# Patient Record
Sex: FEMALE | Race: WHITE | Employment: OTHER | URBAN - METROPOLITAN AREA
[De-identification: names, ages, dates, MRNs, and addresses within clinical notes are randomized per-mention and may not be internally consistent; named-entity substitution may affect disease eponyms.]

---

## 2019-07-15 PROBLEM — N81.9 PELVIC PROLAPSE: Status: ACTIVE | Noted: 2017-01-24

## 2019-07-15 PROBLEM — D04.71 SQUAMOUS CELL CARCINOMA IN SITU OF SKIN OF RIGHT LOWER LEG: Status: ACTIVE | Noted: 2019-02-15

## 2019-07-15 PROBLEM — N18.30 STAGE 3 CHRONIC KIDNEY DISEASE (HCC): Status: ACTIVE | Noted: 2019-01-16

## 2019-07-15 PROBLEM — E66.9 OBESITY (BMI 30-39.9): Status: ACTIVE | Noted: 2019-01-16

## 2019-07-15 PROBLEM — F41.1 GAD (GENERALIZED ANXIETY DISORDER): Status: ACTIVE | Noted: 2019-01-16

## 2019-07-15 PROBLEM — E87.70 HYPERVOLEMIA: Status: ACTIVE | Noted: 2019-01-16

## 2019-07-15 PROBLEM — E78.5 HYPERLIPIDEMIA: Status: ACTIVE | Noted: 2019-01-16

## 2019-07-15 PROBLEM — M19.90 OSTEOARTHROSIS: Status: ACTIVE | Noted: 2019-01-16

## 2019-07-15 PROBLEM — M54.30 SCIATICA: Status: ACTIVE | Noted: 2019-01-16

## 2019-07-15 PROBLEM — I10 MALIGNANT ESSENTIAL HYPERTENSION: Status: ACTIVE | Noted: 2019-01-16

## 2019-07-15 PROBLEM — Z90.710 S/P HYSTERECTOMY: Status: ACTIVE | Noted: 2017-08-24

## 2020-01-20 PROBLEM — Z86.39 HISTORY OF HYPERCALCEMIA: Status: ACTIVE | Noted: 2020-01-20

## 2020-01-20 PROBLEM — E87.1 HYPONATREMIA: Status: ACTIVE | Noted: 2020-01-20

## 2023-01-27 ENCOUNTER — TELEPHONE (OUTPATIENT)
Dept: CARDIOLOGY CLINIC | Age: 83
End: 2023-01-27

## 2023-01-27 NOTE — TELEPHONE ENCOUNTER
Spoke w/ pt. Made appt with Dr Aj Sam 2/1/23 at [place] [address] in [place]. Patient is agreeable to this appt date/time.

## 2023-01-27 NOTE — TELEPHONE ENCOUNTER
New Patient Referral    Referring Provider Name:Marianna Oquendo   Phone Number:[phone]   Fax Number:[phone]  Address: [address] [place] [address]    Diagnosis/Reason for Visit: tachycardia    Cardiac Clearance? No     Cardiac Testing: (Yes/No/Unsure) 1 wk holter monitor Melissa Salmeron  1/9/23, EKG pcp office, echo- Arlander Dubin a yr or two ago     Date testing was completed?  1/16/3___________      Have records been requested? (Yes/No)    Preferred Language:____english___________    needed? (Yes/No)    Pt request [id number] ( her son sees her)

## 2023-02-01 ENCOUNTER — OFFICE VISIT (OUTPATIENT)
Dept: CARDIOLOGY CLINIC | Age: 83
End: 2023-02-01
Payer: MEDICARE

## 2023-02-01 VITALS
HEIGHT: 63 IN | SYSTOLIC BLOOD PRESSURE: 160 MMHG | OXYGEN SATURATION: 98 % | DIASTOLIC BLOOD PRESSURE: 70 MMHG | BODY MASS INDEX: 30.12 KG/M2 | HEART RATE: 83 BPM | WEIGHT: 170 LBS

## 2023-02-01 DIAGNOSIS — R07.9 CHEST PAIN, UNSPECIFIED TYPE: ICD-10-CM

## 2023-02-01 DIAGNOSIS — I65.23 BILATERAL CAROTID ARTERY STENOSIS: ICD-10-CM

## 2023-02-01 DIAGNOSIS — R00.1 BRADYCARDIA: ICD-10-CM

## 2023-02-01 DIAGNOSIS — I10 HYPERTENSION, UNSPECIFIED TYPE: Primary | ICD-10-CM

## 2023-02-01 DIAGNOSIS — E78.49 OTHER HYPERLIPIDEMIA: ICD-10-CM

## 2023-02-01 DIAGNOSIS — R06.02 SHORTNESS OF BREATH: ICD-10-CM

## 2023-02-01 DIAGNOSIS — I47.1 SVT (SUPRAVENTRICULAR TACHYCARDIA) (HCC): ICD-10-CM

## 2023-02-01 PROCEDURE — 1123F ACP DISCUSS/DSCN MKR DOCD: CPT | Performed by: INTERNAL MEDICINE

## 2023-02-01 PROCEDURE — G8399 PT W/DXA RESULTS DOCUMENT: HCPCS | Performed by: INTERNAL MEDICINE

## 2023-02-01 PROCEDURE — 99204 OFFICE O/P NEW MOD 45 MIN: CPT | Performed by: INTERNAL MEDICINE

## 2023-02-01 PROCEDURE — 93000 ELECTROCARDIOGRAM COMPLETE: CPT | Performed by: INTERNAL MEDICINE

## 2023-02-01 PROCEDURE — 3078F DIAST BP <80 MM HG: CPT | Performed by: INTERNAL MEDICINE

## 2023-02-01 PROCEDURE — G8427 DOCREV CUR MEDS BY ELIG CLIN: HCPCS | Performed by: INTERNAL MEDICINE

## 2023-02-01 PROCEDURE — G8484 FLU IMMUNIZE NO ADMIN: HCPCS | Performed by: INTERNAL MEDICINE

## 2023-02-01 PROCEDURE — 3074F SYST BP LT 130 MM HG: CPT | Performed by: INTERNAL MEDICINE

## 2023-02-01 PROCEDURE — 1090F PRES/ABSN URINE INCON ASSESS: CPT | Performed by: INTERNAL MEDICINE

## 2023-02-01 PROCEDURE — 1036F TOBACCO NON-USER: CPT | Performed by: INTERNAL MEDICINE

## 2023-02-01 PROCEDURE — G8417 CALC BMI ABV UP PARAM F/U: HCPCS | Performed by: INTERNAL MEDICINE

## 2023-02-01 RX ORDER — LISINOPRIL 10 MG/1
TABLET ORAL 2 TIMES DAILY
COMMUNITY
Start: 2023-01-05

## 2023-02-01 RX ORDER — PITAVASTATIN CALCIUM 4.18 MG/1
4 TABLET, FILM COATED ORAL NIGHTLY
Qty: 90 TABLET | Refills: 3 | Status: SHIPPED | OUTPATIENT
Start: 2023-02-01 | End: 2023-02-09

## 2023-02-01 RX ORDER — ASPIRIN 81 MG/1
81 TABLET ORAL DAILY
COMMUNITY
Start: 2022-11-23

## 2023-02-01 NOTE — PROGRESS NOTES
[address]   Cardiac Evaluation      Patient: Palmer Lowe  YOB: 1940         Chief Complaint   Patient presents with    Hyperlipidemia    Hypertension        Referring provider: Gume Moura MD    History of Present Illness:   Ms Juany Nieto is seen as a new patient for tachycardia, chest heaviness. She is seen at the request of Dr Deana Edmonds. She wore a cardiac monitor for 6 days 1/9/23-1/16/23 that revealed max  and 10 runs SVT. Carotid doppler 11/17/22 revealed <50% stenosis bilaterally. Previous carotid dopplers have shown 50-69% TIN; she follows with Dr Tita Singh for this. Medical history includes CKD, HTN, anxiety, untreated hyperlipidemia. She is severely intolerant to statins. She does not smoke. She is retired from [email or web address] as a resident . She smoked as a teenager. Maya Natarajan does not have an early family history of heart disease. Maya Natarajan states she has chest heaviness and SOB with inclines and with carrying boxes. . She has symptoms with taking out/in trash cans. She states this has been ongoing for approx 1 month. She has heart racing mostly while at rest. Maya Natarajan does not do regular exercise. The most exertion she does is clean her house. She states she sleeps well at night. She does not nap during the day. She sleeps in a recliner because of back problems. Past Medical History:   has a past medical history of Basal cell carcinoma of nose and Hypertension. HLD, carotid stenosis, anxiety, CKD    Surgical History:   has a past surgical history that includes Hysterectomy; knee surgery (Bilateral); Mohs surgery; and Squamous cell carcinoma excision.      Current Outpatient Medications   Medication Sig Dispense Refill    lisinopril (PRINIVIL;ZESTRIL) 10 MG tablet Take by mouth 2 times daily      aspirin 81 MG EC tablet Take 81 mg by mouth daily      Plant Sterol Stanol-Pantethine 300-100 MG CAPS Take by mouth      spironolactone (ALDACTONE) 25 MG tablet Take 1 tablet by mouth daily 7 tablet 0    B Complex-C (VITAMIN B + C COMPLEX PO) Take 1 tablet by mouth daily      MAGNESIUM CHLORIDE PO Take 250 mg by mouth 2 times daily      vitamin C (ASCORBIC ACID) 500 MG tablet Take 500 mg by mouth daily      LORazepam (ATIVAN) 0.5 MG tablet Take 0.5 mg by mouth 3 times daily. cloNIDine (CATAPRES) 0.1 MG tablet Take 0.1 mg by mouth 3 times daily        No current facility-administered medications for this visit. Allergies:  Atenolol, Ciprofloxacin, Amlodipine besylate, Bacitracin-polymyxin b, Bactroban [mupirocin calcium], Diltiazem hcl, Hydrochlorothiazide w-triamterene, Iodine, Olmesartan, Penicillins, Shellfish-derived products, Simvastatin, Sulfa antibiotics, Tetanus antitoxin, Tetracyclines & related, Triamterene, Cephalosporins, Hydromorphone, Morphine and related, Mupirocin, Nifedipine, Propoxyphene, Sulfamethoxazole-trimethoprim, and Terazosin     Social History:  Social History     Socioeconomic History    Marital status:      Spouse name: Not on file    Number of children: Not on file    Years of education: Not on file    Highest education level: Not on file   Occupational History    Not on file   Tobacco Use    Smoking status: Never    Smokeless tobacco: Never   Substance and Sexual Activity    Alcohol use: Not Currently    Drug use: Never    Sexual activity: Not on file   Other Topics Concern    Not on file   Social History Narrative    Not on file     Social Determinants of Health     Financial Resource Strain: Not on file   Food Insecurity: Not on file   Transportation Needs: Not on file   Physical Activity: Not on file   Stress: Not on file   Social Connections: Not on file   Intimate Partner Violence: Not on file   Housing Stability: Not on file       Family History:   Family History   Problem Relation Age of Onset    Cancer Mother     Hypertension Mother     Cancer Father      Family history has been reviewed and not pertinent except as noted above. Review of Systems:   Constitutional: there has been no unanticipated weight loss. No change in energy or activity level   Eyes: No visual changes   ENT: No Headaches, hearing loss or vertigo. No mouth sores or sore throat. Cardiovascular: Reviewed in HPI  Respiratory: No cough or wheezing, no sputum production. Gastrointestinal: No abdominal pain, appetite loss, blood in stools. No change in bowel or bladder habits. Genitourinary: No nocturia, dysuria, trouble voiding  Musculoskeletal:  No gait disturbance, weakness or joint complaints. Integumentary: No rash or pruritis. Neurological: No headache, change in muscle strength, numbness or tingling. No change in gait, balance, coordination, mood, affect, memory, mentation, behavior. Psychiatric: No anxiety or depression  Endocrine: No malaise or fever  Hematologic/Lymphatic: No abnormal bruising or bleeding, blood clots or swollen lymph nodes. Allergic/Immunologic: No nasal congestion or hives. Physical Examination:    Vitals:    02/01/23 0955 02/01/23 0956   BP: (!) 170/66 (!) 160/70   Site: Left Upper Arm Left Upper Arm   Position: Sitting Sitting   Cuff Size: Medium Adult Medium Adult   Pulse: 83    SpO2: 98%    Weight: 170 lb (77.1 kg)    Height: 5' 3\" (1.6 m)      Body mass index is 30.11 kg/m². Wt Readings from Last 3 Encounters:   02/01/23 170 lb (77.1 kg)   11/28/22 171 lb (77.6 kg)   10/06/22 173 lb (78.5 kg)      BP Readings from Last 3 Encounters:   02/01/23 (!) 160/70   11/28/22 (!) 140/62   10/06/22 (!) 152/72        Physical Examination:    CONSTITUTIONAL: Well developed, well nourished  EYES: PERRLA. No xanthelasma, sclera non icteric  EARS,NOSE,MOUTH,THROAT:  Mucous membranes moist, normal hearing  NECK: Supple, JVP normal, thyroid not enlarged. Carotids 2+ without bruits  RESPIRATORY: Normal effort, no rales or rhonchi  CARDIOVASCULAR: Normal PMI, regular rate and rhythm, no rub or gallop. No edema.  Radial pulses present and equal, 2/6 HANSA mid peaking w/ transmitted murmur to the carotid which is louder on the left  CHEST: No scar or masses  ABDOMEN: Normal bowel sounds. No masses or tenderness. No bruit  MUSCULOSKELETAL: No clubbing or cyanosis. Moves all extremities well. Normal gait  SKIN:  Warm and dry. No rashes  NEUROLOGIC: Cranial nerves intact. Alert and oriented  PSYCHIATRIC: Calm affect. Appears to have normal judgement and insight        Assessment/Plan  1. Essential hypertension - high today, she is taking Lisinopril, Aldactone, Clonidine   She states her sb/p this morning was 137  Echo 6/14/21: The left ventricular wall motion is normal.  The left atrium is mildly dilated. Right ventricular systolic pressure is normal at <35 mmHg. Mild tricuspid regurgitation is present. Overall left ventricular ejection fraction is estimated to be 55-60%. The Aortic Valve leaflets appear sclerotic. There is trace mitral regurgitation. There is mild mitral annular calcification   2. Other hyperlipidemia - untreated, intolerant to statins. Labs 1/5/23: ; TRIG 96; HDL 61; , has tried to Simvastatin and Atorvastatin   3. SVT (supraventricular tachycardia) (HCC) - monitor revealed SVT longest 10 beats at 154bpm   4. Bradycardia    5. Bilateral carotid artery stenosis -follows closely with Dr Robert Zee  Carotid doppler 11/22: <50% bilateral   Carotid doppler 10/1521: 50-69% TIN   6. Chest heaviness/SOB - EKG>SR w/ 1st degree AV block. Her chest pain is very concerning for new onset angina. She states she has a shellfish allergy and some renal insuff so I will start with a stress test .  She has RF for CAD with untreated hyperlipidemia and htn as well as her age and PVD. She has multiple allergy and intolerances. I will not start BB until after her GXT. We will give her NTG to use prn. PLAN:  Cardiac monitor results discussed. Will order stress test to evaluate chest pain/SOB. Recommend Livalo 4mg daily for cholesterol.  FU 2 months. Scribe's attestation: This note was scribed in the presence of Dr Tj Moreno MD by Jose Alfredo Posey RN. The scribe's documentation has been prepared under my direction and personally reviewed by me in its entirety. I confirm that the note above accurately reflects all work, treatment, procedures, and medical decision making performed by me. Thank you for allowing to me to participate in the care of Williams Robert.

## 2023-02-06 RX ORDER — NITROGLYCERIN 0.4 MG/1
0.4 TABLET SUBLINGUAL EVERY 5 MIN PRN
COMMUNITY
End: 2023-02-06 | Stop reason: SDUPTHER

## 2023-02-06 RX ORDER — NITROGLYCERIN 0.4 MG/1
0.4 TABLET SUBLINGUAL EVERY 5 MIN PRN
Qty: 25 TABLET | Refills: 0 | Status: SHIPPED | OUTPATIENT
Start: 2023-02-06 | End: 2023-02-08 | Stop reason: SDUPTHER

## 2023-02-06 NOTE — TELEPHONE ENCOUNTER
Pt called stating they she did more than normal today putting isaiah stuff away, they stated they felt heaviness in the middle of their chest BP is higher than normal 161/81 HR 77, pt wants to know what they should do? They are scheduled for stress test 2/10.     Pls advise thank you     Jan   [phone]

## 2023-02-06 NOTE — TELEPHONE ENCOUNTER
Per Dr. Marks Left take it easy until Friday. If she has any more chest tightness take a nitro and if Pain persist go to the ER. I spoke to pt and gave instructions.

## 2023-02-07 ENCOUNTER — TELEPHONE (OUTPATIENT)
Dept: CARDIOLOGY CLINIC | Age: 83
End: 2023-02-07

## 2023-02-07 NOTE — TELEPHONE ENCOUNTER
Pt called stating that the pharmacy does not have the RX for the Nitro, pt had to change from [place] to [place] due to her insurance. Pt stated she is not having the CP because she is not doing anything. Pls advise thank you   nitroGLYCERIN (NITROSTAT) 0.4 MG SL tablet   0.4 mg  25 tablet  Place 1 tablet under the tongue every 5 minutes as needed for Chest pain up to max of 3 total doses. If no relief after 1 dose, call [phone], [address] [facility] [phone] - f [phone]   [facility] [place] [place] [address]   Phone:  [phone]  Fax:  [phone]

## 2023-02-07 NOTE — TELEPHONE ENCOUNTER
Submitted PA for LIVALO  Via CM Key: [id number] STATUS: DENIED    DENIAL LETTER SCANNED INTO THE CHART

## 2023-02-08 RX ORDER — NITROGLYCERIN 0.4 MG/1
0.4 TABLET SUBLINGUAL EVERY 5 MIN PRN
Qty: 25 TABLET | Refills: 0 | Status: SHIPPED | OUTPATIENT
Start: 2023-02-08

## 2023-02-09 RX ORDER — PITAVASTATIN MAGNESIUM 4 MG/1
4 TABLET, FILM COATED ORAL DAILY
Qty: 30 TABLET | Refills: 6 | Status: SHIPPED | OUTPATIENT
Start: 2023-02-09

## 2023-02-09 NOTE — TELEPHONE ENCOUNTER
I called Ms Lockhart Shannan to let her know that Lazarus Pong was denied on her insurance. Dr Kelleen Phoenix is recommending Zypitamag 4mg daily which is preferred on patient's formulary. Ms Richy Shannan prefers it be sent to [facility] to try it first before going to mail order.

## 2023-02-10 ENCOUNTER — HOSPITAL ENCOUNTER (OUTPATIENT)
Dept: NON INVASIVE DIAGNOSTICS | Age: 83
Discharge: HOME | End: 2023-02-10
Payer: MEDICARE

## 2023-02-10 DIAGNOSIS — R07.9 CHEST PAIN, UNSPECIFIED TYPE: ICD-10-CM

## 2023-02-10 DIAGNOSIS — I10 HYPERTENSION, UNSPECIFIED TYPE: ICD-10-CM

## 2023-02-10 DIAGNOSIS — R06.02 SHORTNESS OF BREATH: ICD-10-CM

## 2023-02-10 LAB
LV EF: 73 %
LVEF MODALITY: NORMAL

## 2023-02-10 PROCEDURE — 6360000002 HC RX W HCPCS: Performed by: INTERNAL MEDICINE

## 2023-02-10 PROCEDURE — 3430000000 HC RX DIAGNOSTIC RADIOPHARMACEUTICAL: Performed by: INTERNAL MEDICINE

## 2023-02-10 PROCEDURE — A9502 TC99M TETROFOSMIN: HCPCS | Performed by: INTERNAL MEDICINE

## 2023-02-10 PROCEDURE — 93017 CV STRESS TEST TRACING ONLY: CPT | Performed by: INTERNAL MEDICINE

## 2023-02-10 PROCEDURE — 78452 HT MUSCLE IMAGE SPECT MULT: CPT

## 2023-02-10 RX ORDER — AMINOPHYLLINE DIHYDRATE 25 MG/ML
100 INJECTION, SOLUTION INTRAVENOUS ONCE
Status: COMPLETED | OUTPATIENT
Start: 2023-02-10 | End: 2023-02-10

## 2023-02-10 RX ADMIN — TETROFOSMIN 30 MILLICURIE: 1.38 INJECTION, POWDER, LYOPHILIZED, FOR SOLUTION INTRAVENOUS at 09:55

## 2023-02-10 RX ADMIN — TETROFOSMIN 10 MILLICURIE: 1.38 INJECTION, POWDER, LYOPHILIZED, FOR SOLUTION INTRAVENOUS at 08:41

## 2023-02-10 RX ADMIN — REGADENOSON 0.4 MG: 0.08 INJECTION, SOLUTION INTRAVENOUS at 09:50

## 2023-02-10 RX ADMIN — AMINOPHYLLINE 100 MG: 25 INJECTION, SOLUTION INTRAVENOUS at 10:04

## 2023-02-10 NOTE — PROGRESS NOTES
Instructed on Lexiscan Stress Test Procedure including possible side effects/ adverse reactions. Patient verbalizes  understanding and denies having any questions . See [address] [place] [address] Cardiology

## 2023-02-10 NOTE — PROGRESS NOTES
Aminophylline given per lexiscan protocol in stress lab for c/o persistant 5/10 chest heaviness. Patient tolerated well. Will continue to monitor.

## 2023-02-13 NOTE — PROGRESS NOTES
[facility]   Cardiac Evaluation      Patient: Tarik Serrano  YOB: 1940         Chief Complaint   Patient presents with    Hypertension    Hyperlipidemia          Referring provider: Dustin Miner MD    History of Present Illness:   Ms Beltran Lowe is seen in follow up to tachycardia, chest heaviness. She was initially seen at the request of Dr Sommer Davis. She wore a cardiac monitor for 6 days 1/9/23-1/16/23 that revealed max  and 10 runs SVT. Carotid doppler 11/17/22 revealed <50% stenosis bilaterally. Previous carotid dopplers have shown 50-69% TIN; she follows with Dr Faith Lindsey for this. Medical history includes CKD, HTN, anxiety, untreated hyperlipidemia. She is severely intolerant to statins. She does not smoke. 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 She smoked as a teenager. Stacey Goodwin does not have an early family history of heart disease. She had a stress test on 2/10/23 that revealed normal perfusion   Stacey Goodwin states she had neck and shoulder pain recently. She denies exertional chest pain, palpitations, dizziness, or edema. Past Medical History:   has a past medical history of Basal cell carcinoma of nose and Hypertension. HLD, carotid stenosis, anxiety, CKD    Surgical History:   has a past surgical history that includes Hysterectomy; knee surgery (Bilateral); Mohs surgery; and Squamous cell carcinoma excision.      Current Outpatient Medications   Medication Sig Dispense Refill    lisinopril (PRINIVIL;ZESTRIL) 10 MG tablet Take by mouth 2 times daily      aspirin 81 MG EC tablet Take 81 mg by mouth daily      Plant Sterol Stanol-Pantethine 300-100 MG CAPS Take by mouth 3 tabs twice daily      spironolactone (ALDACTONE) 25 MG tablet Take 1 tablet by mouth daily 7 tablet 0    B Complex-C (VITAMIN B + C COMPLEX PO) Take 1 tablet by mouth daily      MAGNESIUM CHLORIDE PO Take 250 mg by mouth 2 times daily      vitamin C (ASCORBIC ACID) 500 MG tablet Take 500 mg by mouth daily      LORazepam (ATIVAN) 0.5 MG tablet Take 0.5 mg by mouth 3 times daily. cloNIDine (CATAPRES) 0.1 MG tablet Take 0.1 mg by mouth 3 times daily       Pitavastatin Magnesium (ZYPITAMAG) 4 MG TABS Take 4 mg by mouth daily (Patient not taking: Reported on 2/21/2023) 30 tablet 6    nitroGLYCERIN (NITROSTAT) 0.4 MG SL tablet Place 1 tablet under the tongue every 5 minutes as needed for Chest pain up to max of 3 total doses. If no relief after 1 dose, call 911. 25 tablet 0    dapagliflozin (FARXIGA) 5 MG tablet Take 1 tablet by mouth every morning (Patient not taking: Reported on 2/21/2023) 30 tablet 3     No current facility-administered medications for this visit. Allergies:  Atenolol, Ciprofloxacin, Amlodipine besylate, Bacitracin-polymyxin b, Bactroban [mupirocin calcium], Diltiazem hcl, Hydrochlorothiazide w-triamterene, Iodine, Olmesartan, Penicillins, Shellfish-derived products, Simvastatin, Sulfa antibiotics, Tetanus antitoxin, Tetracyclines & related, Triamterene, Cephalosporins, Hydromorphone, Morphine and related, Mupirocin, Nifedipine, Propoxyphene, Sulfamethoxazole-trimethoprim, and Terazosin     Social History:  Social History     Socioeconomic History    Marital status:       Spouse name: Not on file    Number of children: Not on file    Years of education: Not on file    Highest education level: Not on file   Occupational History    Not on file   Tobacco Use    Smoking status: Never    Smokeless tobacco: Never   Substance and Sexual Activity    Alcohol use: Not Currently    Drug use: Never    Sexual activity: Not on file   Other Topics Concern    Not on file   Social History Narrative    Not on file     Social Determinants of Health     Financial Resource Strain: Not on file   Food Insecurity: Not on file   Transportation Needs: Not on file   Physical Activity: Not on file   Stress: Not on file   Social Connections: Not on file   Intimate Partner Violence: Not on file   Housing Stability: Not on file       Family History:   Family History   Problem Relation Age of Onset    Cancer Mother     Hypertension Mother     Cancer Father      Family history has been reviewed and not pertinent except as noted above. Review of Systems:   Constitutional: there has been no unanticipated weight loss. No change in energy or activity level   Eyes: No visual changes   ENT: No Headaches, hearing loss or vertigo. No mouth sores or sore throat. Cardiovascular: Reviewed in HPI  Respiratory: No cough or wheezing, no sputum production. Gastrointestinal: No abdominal pain, appetite loss, blood in stools. No change in bowel or bladder habits. Genitourinary: No nocturia, dysuria, trouble voiding  Musculoskeletal:  No gait disturbance, weakness or joint complaints. Integumentary: No rash or pruritis. Neurological: No headache, change in muscle strength, numbness or tingling. No change in gait, balance, coordination, mood, affect, memory, mentation, behavior. Psychiatric: No anxiety or depression  Endocrine: No malaise or fever  Hematologic/Lymphatic: No abnormal bruising or bleeding, blood clots or swollen lymph nodes. Allergic/Immunologic: No nasal congestion or hives. Physical Examination:    Vitals:    02/21/23 0923 02/21/23 0957   BP: (!) 154/54 136/60   Site: Left Upper Arm Left Upper Arm   Position: Sitting Sitting   Cuff Size: Large Adult Large Adult   Pulse: 72    SpO2: 99%    Weight: 169 lb (76.7 kg)    Height: 5' 3\" (1.6 m)        Body mass index is 29.94 kg/m². Wt Readings from Last 3 Encounters:   02/21/23 169 lb (76.7 kg)   02/03/23 174 lb (78.9 kg)   02/01/23 170 lb (77.1 kg)      BP Readings from Last 3 Encounters:   02/21/23 136/60   02/03/23 (!) 180/74   02/01/23 (!) 160/70        Physical Examination:    CONSTITUTIONAL: Well developed, well nourished  EYES: PERRLA.  No xanthelasma, sclera non icteric  EARS,NOSE,MOUTH,THROAT:  Mucous membranes moist, normal hearing  NECK: Supple, JVP normal, thyroid not enlarged. Carotids 2+ without bruits  RESPIRATORY: Normal effort, no rales or rhonchi  CARDIOVASCULAR: Normal PMI, regular rate and rhythm, no rub or gallop. No edema. Radial pulses present and equal, 2/6 HANSA mid peaking w/ transmitted murmur to the carotid which is louder on the left  CHEST: No scar or masses  ABDOMEN: Normal bowel sounds. No masses or tenderness. No bruit  MUSCULOSKELETAL: No clubbing or cyanosis. Moves all extremities well. Normal gait  SKIN:  Warm and dry. No rashes  NEUROLOGIC: Cranial nerves intact. Alert and oriented  PSYCHIATRIC: Calm affect. Appears to have normal judgement and insight        Assessment/Plan  1. Essential hypertension - she is taking Lisinopril, Aldactone, Clonidine  Echo 6/14/21: The left ventricular wall motion is normal.  The left atrium is mildly dilated. Right ventricular systolic pressure is normal at <35 mmHg. Mild tricuspid regurgitation is present. Overall left ventricular ejection fraction is estimated to be 55-60%. The Aortic Valve leaflets appear sclerotic. There is trace mitral regurgitation. There is mild mitral annular calcification   2. Other hyperlipidemia - untreated, intolerant to statins. Labs 1/5/23: ; TRIG 96; HDL 61; , has tried to Simvastatin and Atorvastatin. Rx provided for Zypitamag   3. SVT (supraventricular tachycardia) (HCC) - monitor revealed SVT longest 10 beats at 154bpm; she is intolerant to atenolol and does not wish to try another BB. 4. Bradycardia - HR 72bpm   5. Bilateral carotid artery stenosis -follows closely with Dr Elly Lockwood. She was complaining of pain in her carotid artery but pointing to the muscles in the posterior neck. I explained to her that carotid arteries do not \"hurt\". Carotid doppler 11/22: <50% bilateral   Carotid doppler 10/1521: 50-69% TIN   6. Chest heaviness/SOB - EKG>SR w/ 1st degree AV block. She states she has a shellfish allergy and some renal insuff.    She has RF for CAD with untreated hyperlipidemia and htn as well as her age and PVD. She has multiple allergy and intolerances. Her GXT is normal.  I told her to throw out her NTG tabs. GXT 2/10/23: normal perfusion, normal LV size and systolic function      PLAN:  Stress test reviewed. Rx printed for Zypitamag( her ins suggested this instead of livalo) ; she will send to her pharmacy. Repeat lipids and CMP in 2 months. FU as needed. She does not need NTG. Scribe's attestation: This note was scribed in the presence of Dr Millie Lundberg MD by Nabil Ly RN. The scribe's documentation has been prepared under my direction and personally reviewed by me in its entirety. I confirm that the note above accurately reflects all work, treatment, procedures, and medical decision making performed by me. Thank you for allowing to me to participate in the care of Lexi Neumann.

## 2023-02-21 ENCOUNTER — OFFICE VISIT (OUTPATIENT)
Dept: CARDIOLOGY CLINIC | Age: 83
End: 2023-02-21
Payer: MEDICARE

## 2023-02-21 VITALS
SYSTOLIC BLOOD PRESSURE: 136 MMHG | DIASTOLIC BLOOD PRESSURE: 60 MMHG | BODY MASS INDEX: 29.95 KG/M2 | HEART RATE: 72 BPM | HEIGHT: 63 IN | WEIGHT: 169 LBS | OXYGEN SATURATION: 99 %

## 2023-02-21 DIAGNOSIS — I10 HYPERTENSION, UNSPECIFIED TYPE: Primary | ICD-10-CM

## 2023-02-21 DIAGNOSIS — R07.9 CHEST PAIN, UNSPECIFIED TYPE: ICD-10-CM

## 2023-02-21 DIAGNOSIS — I47.1 SVT (SUPRAVENTRICULAR TACHYCARDIA) (HCC): ICD-10-CM

## 2023-02-21 DIAGNOSIS — E78.49 OTHER HYPERLIPIDEMIA: ICD-10-CM

## 2023-02-21 PROCEDURE — G8427 DOCREV CUR MEDS BY ELIG CLIN: HCPCS | Performed by: INTERNAL MEDICINE

## 2023-02-21 PROCEDURE — 99214 OFFICE O/P EST MOD 30 MIN: CPT | Performed by: INTERNAL MEDICINE

## 2023-02-21 PROCEDURE — 1090F PRES/ABSN URINE INCON ASSESS: CPT | Performed by: INTERNAL MEDICINE

## 2023-02-21 PROCEDURE — 1036F TOBACCO NON-USER: CPT | Performed by: INTERNAL MEDICINE

## 2023-02-21 PROCEDURE — 3075F SYST BP GE 130 - 139MM HG: CPT | Performed by: INTERNAL MEDICINE

## 2023-02-21 PROCEDURE — 3078F DIAST BP <80 MM HG: CPT | Performed by: INTERNAL MEDICINE

## 2023-02-21 PROCEDURE — 1123F ACP DISCUSS/DSCN MKR DOCD: CPT | Performed by: INTERNAL MEDICINE

## 2023-02-21 PROCEDURE — G8417 CALC BMI ABV UP PARAM F/U: HCPCS | Performed by: INTERNAL MEDICINE

## 2023-02-21 PROCEDURE — G8484 FLU IMMUNIZE NO ADMIN: HCPCS | Performed by: INTERNAL MEDICINE

## 2023-02-21 PROCEDURE — G8399 PT W/DXA RESULTS DOCUMENT: HCPCS | Performed by: INTERNAL MEDICINE

## 2023-02-21 RX ORDER — PITAVASTATIN MAGNESIUM 4 MG/1
4 TABLET, FILM COATED ORAL DAILY
Qty: 30 TABLET | Refills: 6 | Status: SHIPPED | OUTPATIENT
Start: 2023-02-21 | End: 2023-02-21 | Stop reason: SDUPTHER

## 2023-02-21 RX ORDER — PITAVASTATIN MAGNESIUM 4 MG/1
4 TABLET, FILM COATED ORAL DAILY
Qty: 90 TABLET | Refills: 3 | Status: SHIPPED | OUTPATIENT
Start: 2023-02-21

## 2023-03-29 LAB
ALBUMIN SERPL-MCNC: 4.9 G/DL (ref 3.5–5)
ANION GAP SERPL CALCULATED.3IONS-SCNC: 12 MMOL/L (ref 6–18)
BILIRUBIN, URINE: NEGATIVE
BUN BLDV-MCNC: 34 MG/DL (ref 8–26)
CALCIUM SERPL-MCNC: 10.2 MG/DL (ref 8.8–10.1)
CHLORIDE BLD-SCNC: 97 MEQ/L (ref 98–111)
CLARITY: CLEAR
CO2: 25 MMOL/L (ref 21–31)
COLOR: YELLOW
CREAT SERPL-MCNC: 1.1 MG/DL (ref 0.44–1.03)
CREATININE URINE: 81.6 MG/DL
CREATININE URINE: 84.4 MG/DL
EGFR (CKD-EPI): 50 ML/MIN/1.73 M2
GLUCOSE BLD-MCNC: 101 MG/DL (ref 70–99)
GLUCOSE URINE: NEGATIVE
HB: SOURCE: NORMAL
HCT VFR BLD CALC: 40.2 % (ref 36–46)
HEMOGLOBIN: 13.8 G/DL (ref 12–15.2)
KETONES, URINE: NEGATIVE
LEUKOCYTE ESTERASE, URINE: NEGATIVE
MAGNESIUM: 2.2 MG/DL (ref 1.7–2.4)
MCH RBC QN AUTO: 32.3 PG (ref 27–33)
MCHC RBC AUTO-ENTMCNC: 34.4 G/DL (ref 32–36)
MCV RBC AUTO: 93.8 FL (ref 82–97)
MICROALBUMIN UR-MCNC: <12 MG/L
MICROALBUMIN/CREAT UR-RTO: <15 MG/G CREAT
NITRITE, URINE: NEGATIVE
PARATHYROID HORMONE INTACT: 41.89 PG/ML (ref 15–65)
PDW BLD-RTO: 13 %
PH, URINE: 5.5 (ref 5–8)
PHOSPHORUS: 3.5 MG/DL (ref 2.5–4.5)
PLATELET # BLD: 314 THOU/MCL (ref 140–375)
PMV BLD AUTO: 6.6 FL (ref 7.4–11.5)
POTASSIUM SERPL-SCNC: 5.2 MEQ/L (ref 3.6–5.1)
PROTEIN, URINE, RANDOM: 6 MG/DL (ref 0–9.9)
PROTEIN, URINE: NEGATIVE
PROTEIN/CREAT RATIO URINE RAN: 0.07 RATIO
RBC # BLD: 4.29 MIL/MCL (ref 3.8–5.2)
RBC URINE: NEGATIVE
SODIUM BLD-SCNC: 134 MEQ/L (ref 135–145)
SPECIFIC GRAVITY UA: 1.01 (ref 1–1.03)
URINE TYPE: NORMAL
UROBILINOGEN, URINE: NORMAL MG/DL
VITAMIN D 25-HYDROXY: 24.7 NG/ML
WBC # BLD: 5 THOU/MCL (ref 3.6–10.5)